# Patient Record
Sex: FEMALE | Race: BLACK OR AFRICAN AMERICAN | NOT HISPANIC OR LATINO | Employment: OTHER | URBAN - METROPOLITAN AREA
[De-identification: names, ages, dates, MRNs, and addresses within clinical notes are randomized per-mention and may not be internally consistent; named-entity substitution may affect disease eponyms.]

---

## 2018-10-05 ENCOUNTER — OFFICE VISIT (OUTPATIENT)
Dept: NEUROLOGY | Facility: CLINIC | Age: 63
End: 2018-10-05
Payer: COMMERCIAL

## 2018-10-05 ENCOUNTER — HOSPITAL ENCOUNTER (OUTPATIENT)
Dept: RADIOLOGY | Facility: HOSPITAL | Age: 63
Discharge: HOME | End: 2018-10-05
Attending: PSYCHIATRY & NEUROLOGY
Payer: COMMERCIAL

## 2018-10-05 VITALS
BODY MASS INDEX: 27.4 KG/M2 | WEIGHT: 164.44 LBS | HEIGHT: 65 IN | DIASTOLIC BLOOD PRESSURE: 72 MMHG | HEART RATE: 76 BPM | SYSTOLIC BLOOD PRESSURE: 128 MMHG

## 2018-10-05 DIAGNOSIS — M54.41 ACUTE RIGHT-SIDED LOW BACK PAIN WITH RIGHT-SIDED SCIATICA: ICD-10-CM

## 2018-10-05 DIAGNOSIS — S16.1XXA CERVICAL STRAIN, ACUTE, INITIAL ENCOUNTER: ICD-10-CM

## 2018-10-05 DIAGNOSIS — S16.1XXA CERVICAL STRAIN, ACUTE, INITIAL ENCOUNTER: Primary | ICD-10-CM

## 2018-10-05 DIAGNOSIS — G43.009 MIGRAINE WITHOUT AURA AND WITHOUT STATUS MIGRAINOSUS, NOT INTRACTABLE: ICD-10-CM

## 2018-10-05 DIAGNOSIS — S39.012A: ICD-10-CM

## 2018-10-05 PROCEDURE — 99204 OFFICE O/P NEW MOD 45 MIN: CPT | Mod: S$GLB,,, | Performed by: PSYCHIATRY & NEUROLOGY

## 2018-10-05 PROCEDURE — 99999 PR PBB SHADOW E&M-NEW PATIENT-LVL III: CPT | Mod: PBBFAC,,, | Performed by: PSYCHIATRY & NEUROLOGY

## 2018-10-05 PROCEDURE — 72110 X-RAY EXAM L-2 SPINE 4/>VWS: CPT | Mod: 26,,, | Performed by: RADIOLOGY

## 2018-10-05 PROCEDURE — 72052 X-RAY EXAM NECK SPINE 6/>VWS: CPT | Mod: 26,,, | Performed by: RADIOLOGY

## 2018-10-05 PROCEDURE — 72052 X-RAY EXAM NECK SPINE 6/>VWS: CPT | Mod: TC

## 2018-10-05 PROCEDURE — 72110 X-RAY EXAM L-2 SPINE 4/>VWS: CPT | Mod: TC

## 2018-10-05 RX ORDER — METHOCARBAMOL 750 MG/1
750 TABLET, FILM COATED ORAL
Refills: 0 | COMMUNITY
Start: 2018-07-28 | End: 2019-04-05 | Stop reason: SDUPTHER

## 2018-10-05 RX ORDER — AMLODIPINE AND BENAZEPRIL HYDROCHLORIDE 10; 20 MG/1; MG/1
CAPSULE ORAL DAILY
Refills: 1 | COMMUNITY
Start: 2018-09-18

## 2018-10-05 RX ORDER — ESCITALOPRAM OXALATE 20 MG/1
20 TABLET ORAL DAILY
COMMUNITY
Start: 2018-10-01

## 2018-10-05 RX ORDER — SUMATRIPTAN SUCCINATE 100 MG/1
TABLET ORAL
Qty: 9 TABLET | Refills: 6 | Status: SHIPPED | OUTPATIENT
Start: 2018-10-05 | End: 2019-01-08 | Stop reason: SDUPTHER

## 2018-10-05 RX ORDER — PRAVASTATIN SODIUM 40 MG/1
40 TABLET ORAL DAILY
Refills: 0 | COMMUNITY
Start: 2018-09-18

## 2018-10-05 RX ORDER — METHOCARBAMOL 750 MG/1
750 TABLET, FILM COATED ORAL
Refills: 0 | COMMUNITY
Start: 2018-08-30

## 2018-10-05 RX ORDER — SUMATRIPTAN 50 MG/1
50 TABLET, FILM COATED ORAL
Refills: 0 | COMMUNITY
Start: 2018-08-20 | End: 2018-10-05 | Stop reason: DRUGHIGH

## 2018-10-05 RX ORDER — TRIAMTERENE AND HYDROCHLOROTHIAZIDE 75; 50 MG/1; MG/1
TABLET ORAL DAILY
COMMUNITY
Start: 2018-10-01

## 2018-10-05 RX ORDER — MELOXICAM 7.5 MG/1
7.5 TABLET ORAL
Refills: 0 | COMMUNITY
Start: 2018-07-28
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
## 2018-10-05 NOTE — LETTER
October 5, 2018      Ishan Dillon MD  [address] [facility] [address]           [facility]  [address] [place] [id number]  Phone: [phone]  Fax: [phone]          Patient: Radha Robertson   MR Number: [id number]   YOB: 1955   Date of Visit: 10/5/2018       Dear Dr. Ishan Dillon:    Thank you for referring Radha Robertson to me for evaluation. Attached you will find relevant portions of my assessment and plan of care.    If you have questions, please do not hesitate to call me. I look forward to following Radha Robertson along with you.    Sincerely,    James Lucio MD    Enclosure  CC:  No Recipients    If you would like to receive this communication electronically, please contact [email or web address] or [phone] to request more information on [facility] access.    For providers and/or their staff who would like to refer a patient to [facility], please contact us through our one-stop-shop provider referral line, [facility], at [phone].    If you feel you have received this communication in error or would no longer like to receive these types of communications, please e-mail [email or web address]
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
## 2018-10-16 ENCOUNTER — CLINICAL SUPPORT (OUTPATIENT)
Dept: REHABILITATION | Facility: HOSPITAL | Age: 63
End: 2018-10-16
Attending: PSYCHIATRY & NEUROLOGY
Payer: COMMERCIAL

## 2018-10-16 DIAGNOSIS — M54.41 ACUTE RIGHT-SIDED LOW BACK PAIN WITH RIGHT-SIDED SCIATICA: ICD-10-CM

## 2018-10-16 DIAGNOSIS — M54.2 NECK PAIN ON RIGHT SIDE: Primary | ICD-10-CM

## 2018-10-16 PROCEDURE — 97161 PT EVAL LOW COMPLEX 20 MIN: CPT
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
## 2018-10-24 ENCOUNTER — CLINICAL SUPPORT (OUTPATIENT)
Dept: REHABILITATION | Facility: HOSPITAL | Age: 63
End: 2018-10-24
Attending: PSYCHIATRY & NEUROLOGY
Payer: COMMERCIAL

## 2018-10-24 DIAGNOSIS — M54.41 ACUTE RIGHT-SIDED LOW BACK PAIN WITH RIGHT-SIDED SCIATICA: ICD-10-CM

## 2018-10-24 DIAGNOSIS — M54.2 NECK PAIN ON RIGHT SIDE: Primary | ICD-10-CM

## 2018-10-24 PROCEDURE — 97012 MECHANICAL TRACTION THERAPY: CPT

## 2018-10-24 PROCEDURE — 97140 MANUAL THERAPY 1/> REGIONS: CPT

## 2018-10-24 PROCEDURE — 97110 THERAPEUTIC EXERCISES: CPT

## 2018-10-25 ENCOUNTER — CLINICAL SUPPORT (OUTPATIENT)
Dept: REHABILITATION | Facility: HOSPITAL | Age: 63
End: 2018-10-25
Attending: PSYCHIATRY & NEUROLOGY
Payer: COMMERCIAL

## 2018-10-25 DIAGNOSIS — M54.41 ACUTE RIGHT-SIDED LOW BACK PAIN WITH RIGHT-SIDED SCIATICA: ICD-10-CM

## 2018-10-25 DIAGNOSIS — M54.2 NECK PAIN ON RIGHT SIDE: Primary | ICD-10-CM

## 2018-10-25 PROCEDURE — 97140 MANUAL THERAPY 1/> REGIONS: CPT

## 2018-10-25 PROCEDURE — 97110 THERAPEUTIC EXERCISES: CPT

## 2018-10-25 PROCEDURE — 97012 MECHANICAL TRACTION THERAPY: CPT
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
## 2018-10-30 ENCOUNTER — CLINICAL SUPPORT (OUTPATIENT)
Dept: REHABILITATION | Facility: HOSPITAL | Age: 63
End: 2018-10-30
Attending: PSYCHIATRY & NEUROLOGY
Payer: COMMERCIAL

## 2018-10-30 DIAGNOSIS — M54.2 NECK PAIN ON RIGHT SIDE: Primary | ICD-10-CM

## 2018-10-30 DIAGNOSIS — M54.41 ACUTE RIGHT-SIDED LOW BACK PAIN WITH RIGHT-SIDED SCIATICA: ICD-10-CM

## 2018-10-30 PROCEDURE — 97012 MECHANICAL TRACTION THERAPY: CPT

## 2018-10-30 PROCEDURE — 97140 MANUAL THERAPY 1/> REGIONS: CPT

## 2018-10-30 PROCEDURE — 97110 THERAPEUTIC EXERCISES: CPT

## 2018-11-01 ENCOUNTER — CLINICAL SUPPORT (OUTPATIENT)
Dept: REHABILITATION | Facility: HOSPITAL | Age: 63
End: 2018-11-01
Attending: PSYCHIATRY & NEUROLOGY
Payer: COMMERCIAL

## 2018-11-01 DIAGNOSIS — M54.2 NECK PAIN ON RIGHT SIDE: Primary | ICD-10-CM

## 2018-11-01 DIAGNOSIS — M54.41 ACUTE RIGHT-SIDED LOW BACK PAIN WITH RIGHT-SIDED SCIATICA: ICD-10-CM

## 2018-11-01 PROCEDURE — 97110 THERAPEUTIC EXERCISES: CPT

## 2018-11-01 PROCEDURE — 97012 MECHANICAL TRACTION THERAPY: CPT

## 2018-11-01 PROCEDURE — 97140 MANUAL THERAPY 1/> REGIONS: CPT

## 2018-11-07 ENCOUNTER — CLINICAL SUPPORT (OUTPATIENT)
Dept: REHABILITATION | Facility: HOSPITAL | Age: 63
End: 2018-11-07
Attending: PSYCHIATRY & NEUROLOGY
Payer: COMMERCIAL

## 2018-11-07 DIAGNOSIS — M54.2 NECK PAIN ON RIGHT SIDE: Primary | ICD-10-CM

## 2018-11-07 DIAGNOSIS — M54.41 ACUTE RIGHT-SIDED LOW BACK PAIN WITH RIGHT-SIDED SCIATICA: ICD-10-CM

## 2018-11-07 PROCEDURE — 97140 MANUAL THERAPY 1/> REGIONS: CPT

## 2018-11-07 PROCEDURE — 97012 MECHANICAL TRACTION THERAPY: CPT

## 2018-11-07 PROCEDURE — 97110 THERAPEUTIC EXERCISES: CPT
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
## 2018-11-08 ENCOUNTER — CLINICAL SUPPORT (OUTPATIENT)
Dept: REHABILITATION | Facility: HOSPITAL | Age: 63
End: 2018-11-08
Attending: PSYCHIATRY & NEUROLOGY
Payer: COMMERCIAL

## 2018-11-08 DIAGNOSIS — M54.2 NECK PAIN ON RIGHT SIDE: Primary | ICD-10-CM

## 2018-11-08 DIAGNOSIS — M54.41 ACUTE RIGHT-SIDED LOW BACK PAIN WITH RIGHT-SIDED SCIATICA: ICD-10-CM

## 2018-11-08 PROCEDURE — 97012 MECHANICAL TRACTION THERAPY: CPT

## 2018-11-08 PROCEDURE — 97110 THERAPEUTIC EXERCISES: CPT

## 2018-11-08 PROCEDURE — 97140 MANUAL THERAPY 1/> REGIONS: CPT
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
## 2018-11-14 ENCOUNTER — CLINICAL SUPPORT (OUTPATIENT)
Dept: REHABILITATION | Facility: HOSPITAL | Age: 63
End: 2018-11-14
Attending: PSYCHIATRY & NEUROLOGY
Payer: COMMERCIAL

## 2018-11-14 DIAGNOSIS — M54.41 ACUTE RIGHT-SIDED LOW BACK PAIN WITH RIGHT-SIDED SCIATICA: ICD-10-CM

## 2018-11-14 DIAGNOSIS — M54.2 NECK PAIN ON RIGHT SIDE: Primary | ICD-10-CM

## 2018-11-14 PROCEDURE — 97012 MECHANICAL TRACTION THERAPY: CPT

## 2018-11-14 PROCEDURE — 97110 THERAPEUTIC EXERCISES: CPT

## 2018-11-14 PROCEDURE — 97140 MANUAL THERAPY 1/> REGIONS: CPT

## 2018-11-15 ENCOUNTER — CLINICAL SUPPORT (OUTPATIENT)
Dept: REHABILITATION | Facility: HOSPITAL | Age: 63
End: 2018-11-15
Attending: PSYCHIATRY & NEUROLOGY
Payer: COMMERCIAL

## 2018-11-15 DIAGNOSIS — M54.41 ACUTE RIGHT-SIDED LOW BACK PAIN WITH RIGHT-SIDED SCIATICA: ICD-10-CM

## 2018-11-15 DIAGNOSIS — M54.2 NECK PAIN ON RIGHT SIDE: Primary | ICD-10-CM

## 2018-11-15 PROCEDURE — 97110 THERAPEUTIC EXERCISES: CPT

## 2018-11-15 PROCEDURE — 97140 MANUAL THERAPY 1/> REGIONS: CPT | Mod: 59

## 2018-11-15 PROCEDURE — 97012 MECHANICAL TRACTION THERAPY: CPT
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
## 2018-11-21 ENCOUNTER — CLINICAL SUPPORT (OUTPATIENT)
Dept: REHABILITATION | Facility: HOSPITAL | Age: 63
End: 2018-11-21
Attending: PSYCHIATRY & NEUROLOGY
Payer: COMMERCIAL

## 2018-11-21 DIAGNOSIS — M54.41 ACUTE RIGHT-SIDED LOW BACK PAIN WITH RIGHT-SIDED SCIATICA: ICD-10-CM

## 2018-11-21 DIAGNOSIS — M54.2 NECK PAIN ON RIGHT SIDE: Primary | ICD-10-CM

## 2018-11-21 PROCEDURE — 97110 THERAPEUTIC EXERCISES: CPT

## 2018-11-21 PROCEDURE — 97012 MECHANICAL TRACTION THERAPY: CPT

## 2018-11-21 PROCEDURE — 97140 MANUAL THERAPY 1/> REGIONS: CPT | Mod: 59

## 2018-11-29 ENCOUNTER — OFFICE VISIT (OUTPATIENT)
Dept: NEUROLOGY | Facility: CLINIC | Age: 63
End: 2018-11-29
Payer: COMMERCIAL

## 2018-11-29 VITALS
BODY MASS INDEX: 27.77 KG/M2 | HEART RATE: 66 BPM | HEIGHT: 65 IN | DIASTOLIC BLOOD PRESSURE: 80 MMHG | SYSTOLIC BLOOD PRESSURE: 136 MMHG | WEIGHT: 166.69 LBS

## 2018-11-29 DIAGNOSIS — S16.1XXA CERVICAL STRAIN, ACUTE, INITIAL ENCOUNTER: Primary | ICD-10-CM

## 2018-11-29 DIAGNOSIS — S39.012A: ICD-10-CM

## 2018-11-29 PROCEDURE — 99999 PR PBB SHADOW E&M-EST. PATIENT-LVL II: CPT | Mod: PBBFAC,,, | Performed by: PSYCHIATRY & NEUROLOGY

## 2018-11-29 PROCEDURE — 99214 OFFICE O/P EST MOD 30 MIN: CPT | Mod: S$GLB,,, | Performed by: PSYCHIATRY & NEUROLOGY

## 2018-11-29 NOTE — LETTER
November 29, 2018      Ishan Dillon MD  [address] [facility] [address]           [facility]  [address] [place] [id number]  Phone: [phone]  Fax: [phone]          Patient: Radha Robertson   MR Number: [id number]   YOB: 1955   Date of Visit: 11/29/2018       Dear Dr. Ishan Dillon:    Thank you for referring Radha Robertson to me for evaluation. Attached you will find relevant portions of my assessment and plan of care.    If you have questions, please do not hesitate to call me. I look forward to following Radha Robertson along with you.    Sincerely,    James Lucio MD    Enclosure  CC:  No Recipients    If you would like to receive this communication electronically, please contact [email or web address] or [phone] to request more information on [facility] access.    For providers and/or their staff who would like to refer a patient to [facility], please contact us through our one-stop-shop provider referral line, [facility], at [phone].    If you feel you have received this communication in error or would no longer like to receive these types of communications, please e-mail [email or web address]
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
## 2018-11-30 ENCOUNTER — CLINICAL SUPPORT (OUTPATIENT)
Dept: REHABILITATION | Facility: HOSPITAL | Age: 63
End: 2018-11-30
Attending: PSYCHIATRY & NEUROLOGY
Payer: COMMERCIAL

## 2018-11-30 DIAGNOSIS — M54.41 ACUTE RIGHT-SIDED LOW BACK PAIN WITH RIGHT-SIDED SCIATICA: ICD-10-CM

## 2018-11-30 DIAGNOSIS — M54.2 NECK PAIN ON RIGHT SIDE: Primary | ICD-10-CM

## 2018-11-30 PROCEDURE — 97110 THERAPEUTIC EXERCISES: CPT

## 2018-11-30 PROCEDURE — 97140 MANUAL THERAPY 1/> REGIONS: CPT

## 2018-11-30 PROCEDURE — 97012 MECHANICAL TRACTION THERAPY: CPT
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
## 2018-12-04 ENCOUNTER — CLINICAL SUPPORT (OUTPATIENT)
Dept: REHABILITATION | Facility: HOSPITAL | Age: 63
End: 2018-12-04
Attending: PSYCHIATRY & NEUROLOGY
Payer: COMMERCIAL

## 2018-12-04 DIAGNOSIS — M54.41 ACUTE RIGHT-SIDED LOW BACK PAIN WITH RIGHT-SIDED SCIATICA: ICD-10-CM

## 2018-12-04 DIAGNOSIS — M54.2 NECK PAIN ON RIGHT SIDE: Primary | ICD-10-CM

## 2018-12-04 PROCEDURE — 97110 THERAPEUTIC EXERCISES: CPT

## 2018-12-04 PROCEDURE — 97140 MANUAL THERAPY 1/> REGIONS: CPT

## 2018-12-04 PROCEDURE — 97012 MECHANICAL TRACTION THERAPY: CPT
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
## 2018-12-06 ENCOUNTER — CLINICAL SUPPORT (OUTPATIENT)
Dept: REHABILITATION | Facility: HOSPITAL | Age: 63
End: 2018-12-06
Attending: PSYCHIATRY & NEUROLOGY
Payer: COMMERCIAL

## 2018-12-06 DIAGNOSIS — M54.2 NECK PAIN ON RIGHT SIDE: Primary | ICD-10-CM

## 2018-12-06 DIAGNOSIS — M54.41 ACUTE RIGHT-SIDED LOW BACK PAIN WITH RIGHT-SIDED SCIATICA: ICD-10-CM

## 2018-12-06 PROCEDURE — 97140 MANUAL THERAPY 1/> REGIONS: CPT

## 2018-12-06 PROCEDURE — 97014 ELECTRIC STIMULATION THERAPY: CPT

## 2018-12-06 PROCEDURE — 97110 THERAPEUTIC EXERCISES: CPT

## 2018-12-11 ENCOUNTER — CLINICAL SUPPORT (OUTPATIENT)
Dept: REHABILITATION | Facility: HOSPITAL | Age: 63
End: 2018-12-11
Attending: PSYCHIATRY & NEUROLOGY
Payer: COMMERCIAL

## 2018-12-11 DIAGNOSIS — M54.41 ACUTE RIGHT-SIDED LOW BACK PAIN WITH RIGHT-SIDED SCIATICA: ICD-10-CM

## 2018-12-11 DIAGNOSIS — M54.2 NECK PAIN ON RIGHT SIDE: Primary | ICD-10-CM

## 2018-12-11 PROCEDURE — 97012 MECHANICAL TRACTION THERAPY: CPT

## 2018-12-11 PROCEDURE — 97140 MANUAL THERAPY 1/> REGIONS: CPT

## 2018-12-11 PROCEDURE — 97110 THERAPEUTIC EXERCISES: CPT
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
## 2018-12-13 ENCOUNTER — CLINICAL SUPPORT (OUTPATIENT)
Dept: REHABILITATION | Facility: HOSPITAL | Age: 63
End: 2018-12-13
Attending: PSYCHIATRY & NEUROLOGY
Payer: COMMERCIAL

## 2018-12-13 DIAGNOSIS — M54.2 NECK PAIN ON RIGHT SIDE: Primary | ICD-10-CM

## 2018-12-13 DIAGNOSIS — M54.41 ACUTE RIGHT-SIDED LOW BACK PAIN WITH RIGHT-SIDED SCIATICA: ICD-10-CM

## 2018-12-13 PROCEDURE — 97110 THERAPEUTIC EXERCISES: CPT

## 2018-12-13 PROCEDURE — 97140 MANUAL THERAPY 1/> REGIONS: CPT

## 2018-12-13 PROCEDURE — 97012 MECHANICAL TRACTION THERAPY: CPT
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
## 2018-12-17 ENCOUNTER — CLINICAL SUPPORT (OUTPATIENT)
Dept: REHABILITATION | Facility: HOSPITAL | Age: 63
End: 2018-12-17
Attending: PSYCHIATRY & NEUROLOGY
Payer: COMMERCIAL

## 2018-12-17 DIAGNOSIS — M54.2 NECK PAIN ON RIGHT SIDE: Primary | ICD-10-CM

## 2018-12-17 DIAGNOSIS — M54.41 ACUTE RIGHT-SIDED LOW BACK PAIN WITH RIGHT-SIDED SCIATICA: ICD-10-CM

## 2018-12-17 PROCEDURE — 97140 MANUAL THERAPY 1/> REGIONS: CPT

## 2018-12-17 PROCEDURE — 97110 THERAPEUTIC EXERCISES: CPT

## 2018-12-17 PROCEDURE — 97014 ELECTRIC STIMULATION THERAPY: CPT
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
## 2018-12-20 ENCOUNTER — CLINICAL SUPPORT (OUTPATIENT)
Dept: REHABILITATION | Facility: HOSPITAL | Age: 63
End: 2018-12-20
Attending: PSYCHIATRY & NEUROLOGY
Payer: COMMERCIAL

## 2018-12-20 DIAGNOSIS — M54.41 ACUTE RIGHT-SIDED LOW BACK PAIN WITH RIGHT-SIDED SCIATICA: ICD-10-CM

## 2018-12-20 DIAGNOSIS — M54.2 NECK PAIN ON RIGHT SIDE: Primary | ICD-10-CM

## 2018-12-20 PROCEDURE — 97110 THERAPEUTIC EXERCISES: CPT

## 2018-12-20 PROCEDURE — 97014 ELECTRIC STIMULATION THERAPY: CPT

## 2018-12-20 PROCEDURE — 97140 MANUAL THERAPY 1/> REGIONS: CPT

## 2019-01-07 ENCOUNTER — PATIENT MESSAGE (OUTPATIENT)
Dept: NEUROLOGY | Facility: CLINIC | Age: 64
End: 2019-01-07

## 2019-01-07 DIAGNOSIS — S16.1XXS ACUTE STRAIN OF NECK MUSCLE, SEQUELA: Primary | ICD-10-CM

## 2019-01-08 RX ORDER — SUMATRIPTAN SUCCINATE 100 MG/1
TABLET ORAL
Qty: 9 TABLET | Refills: 6 | Status: SHIPPED | OUTPATIENT
Start: 2019-01-08 | End: 2019-03-12 | Stop reason: SDUPTHER
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
## 2019-01-15 ENCOUNTER — CLINICAL SUPPORT (OUTPATIENT)
Dept: REHABILITATION | Facility: HOSPITAL | Age: 64
End: 2019-01-15
Attending: PSYCHIATRY & NEUROLOGY
Payer: MEDICARE

## 2019-01-15 DIAGNOSIS — M54.2 NECK PAIN ON RIGHT SIDE: Primary | ICD-10-CM

## 2019-01-15 DIAGNOSIS — M54.41 ACUTE RIGHT-SIDED LOW BACK PAIN WITH RIGHT-SIDED SCIATICA: ICD-10-CM

## 2019-01-15 PROCEDURE — 97140 MANUAL THERAPY 1/> REGIONS: CPT

## 2019-01-15 PROCEDURE — 97012 MECHANICAL TRACTION THERAPY: CPT

## 2019-01-15 PROCEDURE — 97110 THERAPEUTIC EXERCISES: CPT

## 2019-01-17 ENCOUNTER — CLINICAL SUPPORT (OUTPATIENT)
Dept: REHABILITATION | Facility: HOSPITAL | Age: 64
End: 2019-01-17
Attending: PSYCHIATRY & NEUROLOGY
Payer: MEDICARE

## 2019-01-17 DIAGNOSIS — M54.2 NECK PAIN ON RIGHT SIDE: Primary | ICD-10-CM

## 2019-01-17 DIAGNOSIS — M54.41 ACUTE RIGHT-SIDED LOW BACK PAIN WITH RIGHT-SIDED SCIATICA: ICD-10-CM

## 2019-01-17 PROCEDURE — 97110 THERAPEUTIC EXERCISES: CPT

## 2019-01-17 PROCEDURE — 97140 MANUAL THERAPY 1/> REGIONS: CPT

## 2019-01-17 PROCEDURE — 97012 MECHANICAL TRACTION THERAPY: CPT
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
## 2019-01-22 ENCOUNTER — CLINICAL SUPPORT (OUTPATIENT)
Dept: REHABILITATION | Facility: HOSPITAL | Age: 64
End: 2019-01-22
Attending: PSYCHIATRY & NEUROLOGY
Payer: MEDICARE

## 2019-01-22 DIAGNOSIS — M54.41 ACUTE RIGHT-SIDED LOW BACK PAIN WITH RIGHT-SIDED SCIATICA: ICD-10-CM

## 2019-01-22 DIAGNOSIS — M54.2 NECK PAIN ON RIGHT SIDE: Primary | ICD-10-CM

## 2019-01-22 PROCEDURE — 97110 THERAPEUTIC EXERCISES: CPT

## 2019-01-22 PROCEDURE — 97012 MECHANICAL TRACTION THERAPY: CPT

## 2019-01-22 PROCEDURE — 97140 MANUAL THERAPY 1/> REGIONS: CPT
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
## 2019-01-24 ENCOUNTER — CLINICAL SUPPORT (OUTPATIENT)
Dept: REHABILITATION | Facility: HOSPITAL | Age: 64
End: 2019-01-24
Attending: PSYCHIATRY & NEUROLOGY
Payer: MEDICARE

## 2019-01-24 DIAGNOSIS — M54.2 NECK PAIN ON RIGHT SIDE: Primary | ICD-10-CM

## 2019-01-24 DIAGNOSIS — M54.41 ACUTE RIGHT-SIDED LOW BACK PAIN WITH RIGHT-SIDED SCIATICA: ICD-10-CM

## 2019-01-24 PROCEDURE — 97110 THERAPEUTIC EXERCISES: CPT

## 2019-01-24 PROCEDURE — 97012 MECHANICAL TRACTION THERAPY: CPT

## 2019-01-24 PROCEDURE — 97014 ELECTRIC STIMULATION THERAPY: CPT

## 2019-01-24 PROCEDURE — 97140 MANUAL THERAPY 1/> REGIONS: CPT

## 2019-02-05 ENCOUNTER — CLINICAL SUPPORT (OUTPATIENT)
Dept: REHABILITATION | Facility: HOSPITAL | Age: 64
End: 2019-02-05
Attending: PSYCHIATRY & NEUROLOGY
Payer: MEDICARE

## 2019-02-05 DIAGNOSIS — M54.2 NECK PAIN ON RIGHT SIDE: Primary | ICD-10-CM

## 2019-02-05 DIAGNOSIS — M54.41 ACUTE RIGHT-SIDED LOW BACK PAIN WITH RIGHT-SIDED SCIATICA: ICD-10-CM

## 2019-02-05 PROCEDURE — 97012 MECHANICAL TRACTION THERAPY: CPT

## 2019-02-05 PROCEDURE — 97140 MANUAL THERAPY 1/> REGIONS: CPT | Mod: 59

## 2019-02-05 PROCEDURE — 97110 THERAPEUTIC EXERCISES: CPT
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
## 2019-02-07 ENCOUNTER — CLINICAL SUPPORT (OUTPATIENT)
Dept: REHABILITATION | Facility: HOSPITAL | Age: 64
End: 2019-02-07
Attending: PSYCHIATRY & NEUROLOGY
Payer: MEDICARE

## 2019-02-07 DIAGNOSIS — M54.2 NECK PAIN ON RIGHT SIDE: Primary | ICD-10-CM

## 2019-02-07 DIAGNOSIS — M54.41 ACUTE RIGHT-SIDED LOW BACK PAIN WITH RIGHT-SIDED SCIATICA: ICD-10-CM

## 2019-02-07 PROCEDURE — 97014 ELECTRIC STIMULATION THERAPY: CPT

## 2019-02-07 PROCEDURE — 97110 THERAPEUTIC EXERCISES: CPT

## 2019-02-07 PROCEDURE — 97140 MANUAL THERAPY 1/> REGIONS: CPT
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
## 2019-02-12 ENCOUNTER — CLINICAL SUPPORT (OUTPATIENT)
Dept: REHABILITATION | Facility: HOSPITAL | Age: 64
End: 2019-02-12
Attending: PSYCHIATRY & NEUROLOGY
Payer: MEDICARE

## 2019-02-12 DIAGNOSIS — M54.2 NECK PAIN ON RIGHT SIDE: Primary | ICD-10-CM

## 2019-02-12 DIAGNOSIS — M54.41 ACUTE RIGHT-SIDED LOW BACK PAIN WITH RIGHT-SIDED SCIATICA: ICD-10-CM

## 2019-02-12 PROCEDURE — 97110 THERAPEUTIC EXERCISES: CPT

## 2019-02-12 PROCEDURE — 97140 MANUAL THERAPY 1/> REGIONS: CPT

## 2019-02-12 PROCEDURE — 97014 ELECTRIC STIMULATION THERAPY: CPT
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
## 2019-02-14 ENCOUNTER — CLINICAL SUPPORT (OUTPATIENT)
Dept: REHABILITATION | Facility: HOSPITAL | Age: 64
End: 2019-02-14
Attending: PSYCHIATRY & NEUROLOGY
Payer: MEDICARE

## 2019-02-14 DIAGNOSIS — M54.41 ACUTE RIGHT-SIDED LOW BACK PAIN WITH RIGHT-SIDED SCIATICA: Primary | ICD-10-CM

## 2019-02-14 DIAGNOSIS — M54.2 NECK PAIN ON RIGHT SIDE: ICD-10-CM

## 2019-02-14 PROCEDURE — 97110 THERAPEUTIC EXERCISES: CPT

## 2019-02-14 PROCEDURE — 97012 MECHANICAL TRACTION THERAPY: CPT

## 2019-02-14 PROCEDURE — 97140 MANUAL THERAPY 1/> REGIONS: CPT | Mod: 59
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
## 2019-02-19 ENCOUNTER — CLINICAL SUPPORT (OUTPATIENT)
Dept: REHABILITATION | Facility: HOSPITAL | Age: 64
End: 2019-02-19
Attending: PSYCHIATRY & NEUROLOGY
Payer: MEDICARE

## 2019-02-19 DIAGNOSIS — M54.41 ACUTE RIGHT-SIDED LOW BACK PAIN WITH RIGHT-SIDED SCIATICA: Primary | ICD-10-CM

## 2019-02-19 DIAGNOSIS — M54.2 NECK PAIN ON RIGHT SIDE: ICD-10-CM

## 2019-02-19 PROCEDURE — 97110 THERAPEUTIC EXERCISES: CPT

## 2019-02-19 PROCEDURE — 97012 MECHANICAL TRACTION THERAPY: CPT

## 2019-02-19 PROCEDURE — 97140 MANUAL THERAPY 1/> REGIONS: CPT
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
## 2019-02-21 ENCOUNTER — CLINICAL SUPPORT (OUTPATIENT)
Dept: REHABILITATION | Facility: HOSPITAL | Age: 64
End: 2019-02-21
Attending: PSYCHIATRY & NEUROLOGY
Payer: MEDICARE

## 2019-02-21 DIAGNOSIS — M54.41 ACUTE RIGHT-SIDED LOW BACK PAIN WITH RIGHT-SIDED SCIATICA: Primary | ICD-10-CM

## 2019-02-21 DIAGNOSIS — M54.2 NECK PAIN ON RIGHT SIDE: ICD-10-CM

## 2019-02-21 PROCEDURE — 97110 THERAPEUTIC EXERCISES: CPT

## 2019-02-21 PROCEDURE — 97140 MANUAL THERAPY 1/> REGIONS: CPT

## 2019-02-21 PROCEDURE — 97014 ELECTRIC STIMULATION THERAPY: CPT
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
## 2019-02-26 ENCOUNTER — CLINICAL SUPPORT (OUTPATIENT)
Dept: REHABILITATION | Facility: HOSPITAL | Age: 64
End: 2019-02-26
Attending: PSYCHIATRY & NEUROLOGY
Payer: MEDICARE

## 2019-02-26 DIAGNOSIS — M54.2 NECK PAIN ON RIGHT SIDE: ICD-10-CM

## 2019-02-26 DIAGNOSIS — M54.41 ACUTE RIGHT-SIDED LOW BACK PAIN WITH RIGHT-SIDED SCIATICA: Primary | ICD-10-CM

## 2019-02-26 PROCEDURE — 97110 THERAPEUTIC EXERCISES: CPT

## 2019-02-26 PROCEDURE — 97014 ELECTRIC STIMULATION THERAPY: CPT

## 2019-02-28 ENCOUNTER — CLINICAL SUPPORT (OUTPATIENT)
Dept: REHABILITATION | Facility: HOSPITAL | Age: 64
End: 2019-02-28
Attending: PSYCHIATRY & NEUROLOGY
Payer: MEDICARE

## 2019-02-28 DIAGNOSIS — M54.41 ACUTE RIGHT-SIDED LOW BACK PAIN WITH RIGHT-SIDED SCIATICA: ICD-10-CM

## 2019-02-28 DIAGNOSIS — M54.2 NECK PAIN ON RIGHT SIDE: Primary | ICD-10-CM

## 2019-02-28 PROCEDURE — 97014 ELECTRIC STIMULATION THERAPY: CPT

## 2019-02-28 PROCEDURE — 97110 THERAPEUTIC EXERCISES: CPT

## 2019-02-28 PROCEDURE — 97140 MANUAL THERAPY 1/> REGIONS: CPT
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
## 2019-03-05 ENCOUNTER — CLINICAL SUPPORT (OUTPATIENT)
Dept: REHABILITATION | Facility: HOSPITAL | Age: 64
End: 2019-03-05
Attending: PSYCHIATRY & NEUROLOGY
Payer: MEDICARE

## 2019-03-05 DIAGNOSIS — M54.2 NECK PAIN ON RIGHT SIDE: Primary | ICD-10-CM

## 2019-03-05 DIAGNOSIS — M54.41 ACUTE RIGHT-SIDED LOW BACK PAIN WITH RIGHT-SIDED SCIATICA: ICD-10-CM

## 2019-03-05 PROCEDURE — 97140 MANUAL THERAPY 1/> REGIONS: CPT

## 2019-03-05 PROCEDURE — 97014 ELECTRIC STIMULATION THERAPY: CPT

## 2019-03-05 PROCEDURE — 97110 THERAPEUTIC EXERCISES: CPT
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
## 2019-03-07 ENCOUNTER — CLINICAL SUPPORT (OUTPATIENT)
Dept: REHABILITATION | Facility: HOSPITAL | Age: 64
End: 2019-03-07
Attending: PSYCHIATRY & NEUROLOGY
Payer: MEDICARE

## 2019-03-07 DIAGNOSIS — M54.2 NECK PAIN ON RIGHT SIDE: Primary | ICD-10-CM

## 2019-03-07 DIAGNOSIS — M54.41 ACUTE RIGHT-SIDED LOW BACK PAIN WITH RIGHT-SIDED SCIATICA: ICD-10-CM

## 2019-03-07 PROCEDURE — 97110 THERAPEUTIC EXERCISES: CPT

## 2019-03-07 PROCEDURE — 97014 ELECTRIC STIMULATION THERAPY: CPT

## 2019-03-07 PROCEDURE — 97140 MANUAL THERAPY 1/> REGIONS: CPT

## 2019-03-13 RX ORDER — SUMATRIPTAN SUCCINATE 100 MG/1
TABLET ORAL
Qty: 9 TABLET | Refills: 6 | Status: SHIPPED | OUTPATIENT
Start: 2019-03-13

## 2019-04-04 ENCOUNTER — PATIENT MESSAGE (OUTPATIENT)
Dept: NEUROLOGY | Facility: CLINIC | Age: 64
End: 2019-04-04

## 2019-04-05 RX ORDER — METHOCARBAMOL 750 MG/1
750 TABLET, FILM COATED ORAL 3 TIMES DAILY
Qty: 90 TABLET | Refills: 1 | Status: SHIPPED | OUTPATIENT
Start: 2019-04-05 | End: 2019-05-13 | Stop reason: SDUPTHER

## 2019-05-13 ENCOUNTER — OFFICE VISIT (OUTPATIENT)
Dept: NEUROLOGY | Facility: CLINIC | Age: 64
End: 2019-05-13
Payer: MEDICARE

## 2019-05-13 ENCOUNTER — HOSPITAL ENCOUNTER (OUTPATIENT)
Dept: RADIOLOGY | Facility: HOSPITAL | Age: 64
Discharge: HOME | End: 2019-05-13
Attending: PSYCHIATRY & NEUROLOGY
Payer: MEDICARE

## 2019-05-13 VITALS
DIASTOLIC BLOOD PRESSURE: 82 MMHG | WEIGHT: 167.13 LBS | HEART RATE: 60 BPM | BODY MASS INDEX: 27.85 KG/M2 | SYSTOLIC BLOOD PRESSURE: 122 MMHG | HEIGHT: 65 IN

## 2019-05-13 DIAGNOSIS — M25.532 LEFT WRIST PAIN: ICD-10-CM

## 2019-05-13 DIAGNOSIS — G43.009 MIGRAINE WITHOUT AURA AND WITHOUT STATUS MIGRAINOSUS, NOT INTRACTABLE: ICD-10-CM

## 2019-05-13 PROCEDURE — 73100 X-RAY EXAM OF WRIST: CPT | Mod: 26,LT,, | Performed by: RADIOLOGY

## 2019-05-13 PROCEDURE — 73100 XR WRIST 2 VIEW LEFT: ICD-10-PCS | Mod: 26,LT,, | Performed by: RADIOLOGY

## 2019-05-13 PROCEDURE — 99999 PR PBB SHADOW E&M-EST. PATIENT-LVL III: CPT | Mod: PBBFAC,,, | Performed by: PSYCHIATRY & NEUROLOGY

## 2019-05-13 PROCEDURE — 99213 PR OFFICE/OUTPT VISIT, EST, LEVL III, 20-29 MIN: ICD-10-PCS | Mod: S$PBB,,, | Performed by: PSYCHIATRY & NEUROLOGY

## 2019-05-13 PROCEDURE — 73100 X-RAY EXAM OF WRIST: CPT | Mod: TC,LT

## 2019-05-13 PROCEDURE — 99999 PR PBB SHADOW E&M-EST. PATIENT-LVL III: ICD-10-PCS | Mod: PBBFAC,,, | Performed by: PSYCHIATRY & NEUROLOGY

## 2019-05-13 PROCEDURE — 99213 OFFICE O/P EST LOW 20 MIN: CPT | Mod: S$PBB,,, | Performed by: PSYCHIATRY & NEUROLOGY

## 2019-05-13 PROCEDURE — 99213 OFFICE O/P EST LOW 20 MIN: CPT | Mod: PBBFAC,25 | Performed by: PSYCHIATRY & NEUROLOGY

## 2019-05-13 NOTE — PROGRESS NOTES
Subjective:      Patient ID: Radha Robertson is a 64 y.o. female.    Chief Complaint:  I have pain in my left wrist and in my neck    The patient returns indicating that she had to discontinue physical therapy for her neck pain because the therapy she activities began to bother her causing her more discomfort than benefit.  The patient states that she still has a slight degree of neck pain for which she will take an occasional muscle relaxant with good benefit.  The patient also reports that she has occasional migraine headache.  This is a right hemicranial head pain that is responsive to Imitrex 100 mg.    The patient today has a complaint of pain in the left wrist pointing to the ulnar side of the wrist.  The patient states that pronation/ supination of the left forearm and hand causes discomfort in the area of the left ulnar   Head.  Patient states that this area is also somewhat tender to touch.  She does not recall any particular trauma to the hand or wrist.  However, the intensity of the discomfort does limit the use of the hand.  She is not aware of any numbness, tingling, or other weakness in the left arm or hand.  The patient states that there was intermittently some swelling in the area.          ROS:  GENERAL: NO FEVER, CHILLS, FATIGABILITY OR WEIGHT LOSS.  SKIN: NO RASHES, ITCHING OR CHANGES IN COLOR OR TEXTURE OF SKIN.  HEAD: NO  RECENT HEAD TRAUMA.  EYES: VISUAL ACUITY FINE. NO PHOTOPHOBIA, OCULAR PAIN OR DIPLOPIA.  EARS: DENIES EAR PAIN, DISCHARGE OR VERTIGO.  NOSE: NO LOSS OF SMELL, NO EPISTAXIS OR POSTNASAL DRIP.  MOUTH & THROAT: NO HOARSENESS OR CHANGE IN VOICE. NO EXCESSIVE GUM BLEEDING.  NODES: DENIES SWOLLEN GLANDS.  CHEST: DENIES DESAI, CYANOSIS, WHEEZING, COUGH AND SPUTUM PRODUCTION.  CARDIOVASCULAR: DENIES CHEST PAIN, PND, ORTHOPNEA OR REDUCED EXERCISE TOLERANCE.  ABDOMEN: APPETITE FINE. NO WEIGHT LOSS. DENIES DIARRHEA, ABDOMINAL PAIN, HEMATEMESIS OR BLOOD IN STOOL.  URINARY: NO FLANK PAIN,  DYSURIA OR HEMATURIA.  PERIPHERAL VASCULAR: NO CLAUDICATION OR CYANOSIS.  MUSCULOSKELETAL:  DENIES BACK PAIN.  NEUROLOGIC: NO HISTORY OF SEIZURES, PARALYSIS, ALTERATION OF GAIT OR COORDINATION.    Past Medical History:   Diagnosis Date    Headache     Hyperlipidemia     Hypertension      Past Surgical History:   Procedure Laterality Date    APPENDECTOMY      BILATERAL SALPINGOOPHORECTOMY      HYSTERECTOMY       Family History   Problem Relation Age of Onset    Cancer Mother      Social History     Socioeconomic History    Marital status: Single     Spouse name: Not on file    Number of children: Not on file    Years of education: Not on file    Highest education level: Not on file   Occupational History    Not on file   Social Needs    Financial resource strain: Not on file    Food insecurity:     Worry: Not on file     Inability: Not on file    Transportation needs:     Medical: Not on file     Non-medical: Not on file   Tobacco Use    Smoking status: Never Smoker    Smokeless tobacco: Never Used   Substance and Sexual Activity    Alcohol use: No     Frequency: Never    Drug use: No    Sexual activity: Not Currently   Lifestyle    Physical activity:     Days per week: Not on file     Minutes per session: Not on file    Stress: Not on file   Relationships    Social connections:     Talks on phone: Not on file     Gets together: Not on file     Attends [employer] service: Not on file     Active member of club or organization: Not on file     Attends meetings of clubs or organizations: Not on file     Relationship status: Not on file   Other Topics Concern    Not on file   Social History Narrative    Not on file         Objective:   PE:   VITAL SIGNS:   Vitals:    05/13/19 0759   BP: 122/82   Pulse: 60     APPEARANCE: WELL NOURISHED, WELL DEVELOPED, IN NO ACUTE DISTRESS.    HEAD: NORMOCEPHALIC, ATRAUMATIC.  EYES: PERRL. EOMI.  NON-ICTERIC SCLERAE.    EARS: TM'S INTACT. LIGHT REFLEX NORMAL. NO  RETRACTION OR PERFORATION.    NOSE: MUCOSA PINK. AIRWAY CLEAR.  MOUTH & THROAT: NO TONSILLAR ENLARGEMENT. NO PHARYNGEAL ERYTHEMA OR EXUDATE. NO STRIDOR.  NECK: SUPPLE. NO BRUITS.  MUSCULOSKELETAL:  NO BONY DEFORMITY SEEN.   HOWEVER, THERE IS TENDERNESS TO PALPATION OVER THE HEAD OF THE ULNA ON THE LEFT AT THE WRIST.  THE PAIN SEEMS TO BE INCREASED BY WRIST MOVEMENT INCLUDING PRONATION/SUPINATION, AS WELL AS EXTENSION OF THE WRIST. MUSCLE TONE IS NORMAL.  MUSCLE MASS IS NORMAL.    NEUROLOGIC:   MENTAL STATUS:  THE PATIENT IS WELL ORIENTED TO PERSON, TIME, PLACE, AND SITUATION.  THE PATIENT IS ATTENTIVE TO THE ENVIRONMENT AND COOPERATIVE FOR THE EXAM.  CRANIAL NERVES: II-XII GROSSLY INTACT. FUNDOSCOPIC EXAM IS NORMAL.  NO HEMORRHAGE, EXUDATE OR PAPILLEDEMA IS PRESENT. THE EXTRAOCULAR MUSCLES ARE INTACT IN THE CARDINAL DIRECTIONS OF GAZE.  NO PTOSIS IS PRESENT. FACIAL FEATURES ARE SYMMETRICAL.  SPEECH IS NORMAL IN FLUENCY, DICTION, AND PHRASING.  TONGUE PROTRUDES IN THE MIDLINE.    GAIT AND STATION:  ROMBERG IS NEGATIVE.  GOOD ALTERNATE ARMSWING WITH NORMAL GAIT.  MOTOR:  NO DOWNDRIFT OF EITHER ARM WHEN HELD AT SHOULDER LEVEL.  MANUAL MUSCLE TESTING OF PROXIMAL AND DISTAL MUSCLES OF BOTH UPPER AND LOWER EXTREMITIES IS NORMAL.  SENSORY:  INTACT BOTH UPPER AND LOWER EXTREMITIES TO PIN PRICK, TOUCH, AND VIBRATION.  CEREBELLAR:  FINGER TO NOSE DONE WELL.  ALTERNATING MOVEMENTS INTACT.  NO INVOLUNTARY MOVEMENTS OR TREMOR SEEN.  REFLEXES:  STRETCH REFLEXES ARE 2+ BOTH UPPER AND LOWER EXTREMITIES.  PLANTAR STIMULATION IS FLEXOR BILATERALLY AND NO PATHOLOGICAL REFLEXES ARE SEEN              Assessment:     Encounter Diagnosis   Name Primary?    Left wrist pain      MIGRAINE HEADACHE    Plan:    1.  X-rays of the left wrist  2.  Continue medications as previously prescribed including muscle relaxants as needed for neck pain and Imitrex as needed for migraine headache  3.  Return to Neurology as needed.    This was a 25 min  face-to-face visit with the patient with over 50% of the time spent counseling the patient regarding the complaints of headache, neck pain, and wrist pain.  This note is generated with speech recognition software and is subject to transcription error and sound alike phrases that may be missed by proofreading.

## 2020-04-20 ENCOUNTER — PATIENT MESSAGE (OUTPATIENT)
Dept: NEUROLOGY | Facility: CLINIC | Age: 65
End: 2020-04-20

## 2020-04-23 ENCOUNTER — OFFICE VISIT (OUTPATIENT)
Dept: NEUROLOGY | Facility: CLINIC | Age: 65
End: 2020-04-23
Payer: MEDICARE

## 2020-04-23 DIAGNOSIS — J11.1 FLU SYNDROME: ICD-10-CM

## 2020-04-23 DIAGNOSIS — G43.009 MIGRAINE WITHOUT AURA AND WITHOUT STATUS MIGRAINOSUS, NOT INTRACTABLE: Primary | ICD-10-CM

## 2020-04-23 PROCEDURE — 99213 PR OFFICE/OUTPT VISIT, EST, LEVL III, 20-29 MIN: ICD-10-PCS | Mod: 95,,, | Performed by: PSYCHIATRY & NEUROLOGY

## 2020-04-23 PROCEDURE — 99213 OFFICE O/P EST LOW 20 MIN: CPT | Mod: 95,,, | Performed by: PSYCHIATRY & NEUROLOGY

## 2020-04-23 NOTE — PROGRESS NOTES
The patient location is:   Patient's home  The chief complaint leading to consultation is:  Fever, chills, night sweats and headache  Visit type: audiovisual  Total time spent with patient: 20 minutes  Each patient to whom he or she provides medical services by telemedicine is:  (1) informed of the relationship between the physician and patient and the respective role of any other health care provider with respect to management of the patient; and (2) notified that he or she may decline to receive medical services by telemedicine and may withdraw from such care at any time.    Notes:  The patient reports that she has had an acute febrile illness over the past week to 10 days that consists of severe headache which has seemed to resolve now along with symptoms of fever, night sweats, chills, and anorexia.  The patient denies cough.  She did not note a transient decrease in her sense of taste but does   Deniesany loss of smell. The patient also denies cough, chest pain, or shortness of breath.  The patient states that she had been taking Tylenol for fever and that her measured temperature at home was 99.6.  The patient denies any known exposure to COVID and indicates that there are no other family members who are complaining of her similar symptoms.  However, she is isolated and living alone.    At the time of this video visit with the patient, she denies any headache.  She denies any nausea or vomiting.  However she continues to be febrile and continues to complain of generalized malaise, night sweats, and fever during the day.    Recommendations:   1. On the basis of this history, I have recommended to the patient that she should be seen in urgent care for appropriate testing for influenza and COVID 19 .  The patient indicates that she will get dressed and will present to urgent care for evaluation.  I did discuss with the patient the symptoms and differential diagnosis including influenza as well as  COVID-19.    This note is generated with speech recognition software and is subject to transcription error and sound alike phrases that may be missed by proofreading.

## 2021-04-28 ENCOUNTER — PATIENT MESSAGE (OUTPATIENT)
Dept: RESEARCH | Facility: HOSPITAL | Age: 66
End: 2021-04-28

## 2022-10-10 ENCOUNTER — PATIENT MESSAGE (OUTPATIENT)
Dept: RESEARCH | Facility: HOSPITAL | Age: 67
End: 2022-10-10

## 2024-03-26 ENCOUNTER — PATIENT MESSAGE (OUTPATIENT)
Dept: RESEARCH | Facility: HOSPITAL | Age: 69
End: 2024-03-26